# Patient Record
Sex: FEMALE | Race: WHITE | NOT HISPANIC OR LATINO | URBAN - METROPOLITAN AREA
[De-identification: names, ages, dates, MRNs, and addresses within clinical notes are randomized per-mention and may not be internally consistent; named-entity substitution may affect disease eponyms.]

---

## 2019-09-19 NOTE — PATIENT DISCUSSION
DRY EYES : Discussed with patient the importance of keeping the eye moist and the symptoms associated with dry eyes including blurry vision, tearing, burning, and cruz sensation. Tear Lab was performed today to evaluate the severity of dryness. Advised patient to minimize use of any fans blowing directly on the face. Advised patient to continue with over the counter artificial tears 2-3 times daily.

## 2019-09-19 NOTE — PATIENT DISCUSSION
[place]:   Photo done today to evaluate will keep eye to see if any new changes in size or color. Call office if any changes in vision.

## 2021-10-22 NOTE — PATIENT DISCUSSION
If symptoms worsen other available treatments can be considered. Discussed plugs if still symptomatic at f/u.

## 2022-06-06 ENCOUNTER — ESTABLISHED PATIENT (OUTPATIENT)
Dept: URBAN - METROPOLITAN AREA CLINIC 16 | Facility: CLINIC | Age: 37
End: 2022-06-06

## 2022-06-06 DIAGNOSIS — H52.13: ICD-10-CM

## 2022-06-06 PROCEDURE — 92015 DETERMINE REFRACTIVE STATE: CPT

## 2022-06-06 PROCEDURE — 92014 COMPRE OPH EXAM EST PT 1/>: CPT

## 2022-06-06 ASSESSMENT — TONOMETRY
OS_IOP_MMHG: 20
OD_IOP_MMHG: 17

## 2022-11-16 NOTE — PATIENT DISCUSSION
If symptoms worsen other available treatments can be considered. Discussed plugs and/or [employer] if still symptomatic. Patient tried Restasis in past, did not like and d/c but can't remember why.

## 2022-11-16 NOTE — PATIENT DISCUSSION
New glasses Rx given today. Discussed PAL/bifocal for driving so she can see phone GPS. Continue with OTC readers prn for other near tasks.

## 2023-11-20 ENCOUNTER — ESTABLISHED PATIENT (OUTPATIENT)
Dept: URBAN - METROPOLITAN AREA CLINIC 16 | Facility: CLINIC | Age: 38
End: 2023-11-20

## 2023-11-20 DIAGNOSIS — H52.13: ICD-10-CM

## 2023-11-20 PROCEDURE — 92310C CONTACT LENS 75

## 2023-11-20 PROCEDURE — 92015 DETERMINE REFRACTIVE STATE: CPT

## 2023-11-20 PROCEDURE — 92014 COMPRE OPH EXAM EST PT 1/>: CPT

## 2023-11-20 ASSESSMENT — TONOMETRY
OD_IOP_MMHG: 23
OS_IOP_MMHG: 21
OD_IOP_MMHG: 22
OS_IOP_MMHG: 27

## 2023-11-20 ASSESSMENT — VISUAL ACUITY
OD_CC: 20/20
OS_CC: 20/20

## 2025-01-07 ENCOUNTER — COMPREHENSIVE EXAM (OUTPATIENT)
Age: 40
End: 2025-01-07

## 2025-01-07 DIAGNOSIS — H52.13: ICD-10-CM

## 2025-01-07 PROCEDURE — 92014 COMPRE OPH EXAM EST PT 1/>: CPT

## 2025-01-07 PROCEDURE — 92310-2 LEVEL 2 SOFT LENS UPDATE

## 2025-01-07 PROCEDURE — 92015 DETERMINE REFRACTIVE STATE: CPT
